# Patient Record
Sex: FEMALE | Race: WHITE | NOT HISPANIC OR LATINO | URBAN - METROPOLITAN AREA
[De-identification: names, ages, dates, MRNs, and addresses within clinical notes are randomized per-mention and may not be internally consistent; named-entity substitution may affect disease eponyms.]

---

## 2022-05-03 ENCOUNTER — APPOINTMENT (RX ONLY)
Dept: URBAN - METROPOLITAN AREA CLINIC 51 | Facility: CLINIC | Age: 47
Setting detail: DERMATOLOGY
End: 2022-05-03

## 2022-05-04 ENCOUNTER — APPOINTMENT (RX ONLY)
Dept: URBAN - METROPOLITAN AREA CLINIC 51 | Facility: CLINIC | Age: 47
Setting detail: DERMATOLOGY
End: 2022-05-04

## 2022-05-04 DIAGNOSIS — Z41.9 ENCOUNTER FOR PROCEDURE FOR PURPOSES OTHER THAN REMEDYING HEALTH STATE, UNSPECIFIED: ICD-10-CM

## 2022-05-04 PROCEDURE — ? JUVEDERM VOLBELLA INJECTION

## 2022-05-04 PROCEDURE — ? BOTOX

## 2022-05-04 ASSESSMENT — LOCATION SIMPLE DESCRIPTION DERM
LOCATION SIMPLE: RIGHT CHEEK
LOCATION SIMPLE: LEFT LIP
LOCATION SIMPLE: LEFT TEMPLE
LOCATION SIMPLE: GLABELLA
LOCATION SIMPLE: RIGHT LIP
LOCATION SIMPLE: RIGHT EYEBROW
LOCATION SIMPLE: RIGHT TEMPLE
LOCATION SIMPLE: LEFT CHEEK

## 2022-05-04 ASSESSMENT — LOCATION DETAILED DESCRIPTION DERM
LOCATION DETAILED: LEFT SUPERIOR VERMILION LIP
LOCATION DETAILED: RIGHT SUPERIOR LATERAL MALAR CHEEK
LOCATION DETAILED: RIGHT MEDIAL EYEBROW
LOCATION DETAILED: RIGHT SUPERIOR VERMILION LIP
LOCATION DETAILED: GLABELLA
LOCATION DETAILED: RIGHT INFERIOR TEMPLE
LOCATION DETAILED: LEFT INFERIOR VERMILION LIP
LOCATION DETAILED: LEFT SUPERIOR LATERAL MALAR CHEEK
LOCATION DETAILED: LEFT INFERIOR TEMPLE
LOCATION DETAILED: RIGHT INFERIOR VERMILION LIP

## 2022-05-04 ASSESSMENT — LOCATION ZONE DERM
LOCATION ZONE: LIP
LOCATION ZONE: FACE

## 2022-05-04 NOTE — PROCEDURE: BOTOX
Lateral Platysmal Bands Units: 0
Post-Care Instructions: Patient instructed to not lie down for 4 hours and limit physical activity for 24 hours. Patient instructed not to travel by airplane for 48 hours.
Show Right And Left Periorbital Units: No
Show Masseter Units: Yes
Lot #: [id number]
Additional Area 1 Location: lateral lip
Glabellar Complex Units: [id number] [facility], [place], operated by [facility]
Dilution (U/0.1 Cc): 3.5
Detail Level: Detailed
Periorbital Skin Units: 24
Consent: Written consent obtained. Risks include but not limited to lid/brow ptosis, bruising, swelling, diplopia, temporary effect, incomplete chemical denervation.
Expiration Date (Month Year): 06/2024

## 2022-05-04 NOTE — PROCEDURE: JUVEDERM VOLBELLA INJECTION
Use Map Statement For Sites (Optional): No
Additional Area 3 Volume In Cc: 0
Number Of Syringes (Required For Inventory): 1
Lot #: [id number]
Procedural Text: The filler was administered to the treatment areas noted above.
Consent: Written consent obtained. Risks include but not limited to bruising, beading, irregular texture, ulceration, infection, allergic reaction, scar formation, incomplete augmentation, temporary nature, procedural pain.
Filler: Juvederm Volbella XC
Post-Care Instructions: Patient instructed to apply ice to reduce swelling.
Map Statment: See [address] for Complete Details
Expiration Date (Month Year): January 2023
Detail Level: Detailed
Additional Area 1 Location: lips

## 2022-05-25 ENCOUNTER — APPOINTMENT (RX ONLY)
Dept: URBAN - METROPOLITAN AREA CLINIC 51 | Facility: CLINIC | Age: 47
Setting detail: DERMATOLOGY
End: 2022-05-25

## 2022-05-25 DIAGNOSIS — Z41.9 ENCOUNTER FOR PROCEDURE FOR PURPOSES OTHER THAN REMEDYING HEALTH STATE, UNSPECIFIED: ICD-10-CM
